# Patient Record
Sex: MALE | Race: BLACK OR AFRICAN AMERICAN | NOT HISPANIC OR LATINO | URBAN - METROPOLITAN AREA
[De-identification: names, ages, dates, MRNs, and addresses within clinical notes are randomized per-mention and may not be internally consistent; named-entity substitution may affect disease eponyms.]

---

## 2023-11-09 ENCOUNTER — HOSPITAL ENCOUNTER (EMERGENCY)
Facility: HOSPITAL | Age: 33
Discharge: HOME | End: 2023-11-09

## 2023-11-09 VITALS
HEART RATE: 65 BPM | OXYGEN SATURATION: 99 % | TEMPERATURE: 98.7 F | DIASTOLIC BLOOD PRESSURE: 68 MMHG | RESPIRATION RATE: 18 BRPM | SYSTOLIC BLOOD PRESSURE: 121 MMHG

## 2023-11-09 DIAGNOSIS — R07.89 CHEST WALL PAIN: Primary | ICD-10-CM

## 2023-11-09 LAB
ALBUMIN SERPL BCP-MCNC: 4.4 G/DL (ref 3.4–5)
ALP SERPL-CCNC: 39 U/L (ref 33–120)
ALT SERPL W P-5'-P-CCNC: 17 U/L (ref 10–52)
ANION GAP SERPL CALC-SCNC: 13 MMOL/L (ref 10–20)
AST SERPL W P-5'-P-CCNC: 21 U/L (ref 9–39)
BASOPHILS # BLD AUTO: 0.03 X10*3/UL (ref 0–0.1)
BASOPHILS NFR BLD AUTO: 0.3 %
BILIRUB SERPL-MCNC: 1.3 MG/DL (ref 0–1.2)
BUN SERPL-MCNC: 13 MG/DL (ref 6–23)
CALCIUM SERPL-MCNC: 9.5 MG/DL (ref 8.6–10.3)
CARDIAC TROPONIN I PNL SERPL HS: 3 NG/L (ref 0–20)
CHLORIDE SERPL-SCNC: 105 MMOL/L (ref 98–107)
CO2 SERPL-SCNC: 25 MMOL/L (ref 21–32)
CREAT SERPL-MCNC: 1.27 MG/DL (ref 0.5–1.3)
EOSINOPHIL # BLD AUTO: 0.06 X10*3/UL (ref 0–0.7)
EOSINOPHIL NFR BLD AUTO: 0.6 %
ERYTHROCYTE [DISTWIDTH] IN BLOOD BY AUTOMATED COUNT: 13 % (ref 11.5–14.5)
GFR SERPL CREATININE-BSD FRML MDRD: 77 ML/MIN/1.73M*2
GLUCOSE SERPL-MCNC: 94 MG/DL (ref 74–99)
HCT VFR BLD AUTO: 47.5 % (ref 41–52)
HGB BLD-MCNC: 17.2 G/DL (ref 13.5–17.5)
IMM GRANULOCYTES # BLD AUTO: 0.03 X10*3/UL (ref 0–0.7)
IMM GRANULOCYTES NFR BLD AUTO: 0.3 % (ref 0–0.9)
LYMPHOCYTES # BLD AUTO: 3.75 X10*3/UL (ref 1.2–4.8)
LYMPHOCYTES NFR BLD AUTO: 38.7 %
MCH RBC QN AUTO: 29.1 PG (ref 26–34)
MCHC RBC AUTO-ENTMCNC: 36.2 G/DL (ref 32–36)
MCV RBC AUTO: 80 FL (ref 80–100)
MONOCYTES # BLD AUTO: 0.51 X10*3/UL (ref 0.1–1)
MONOCYTES NFR BLD AUTO: 5.3 %
NEUTROPHILS # BLD AUTO: 5.32 X10*3/UL (ref 1.2–7.7)
NEUTROPHILS NFR BLD AUTO: 54.8 %
NRBC BLD-RTO: 0 /100 WBCS (ref 0–0)
PLATELET # BLD AUTO: 299 X10*3/UL (ref 150–450)
POTASSIUM SERPL-SCNC: 3.7 MMOL/L (ref 3.5–5.3)
PROT SERPL-MCNC: 7.2 G/DL (ref 6.4–8.2)
RBC # BLD AUTO: 5.91 X10*6/UL (ref 4.5–5.9)
SODIUM SERPL-SCNC: 139 MMOL/L (ref 136–145)
WBC # BLD AUTO: 9.7 X10*3/UL (ref 4.4–11.3)

## 2023-11-09 PROCEDURE — 84484 ASSAY OF TROPONIN QUANT: CPT | Performed by: GENERAL PRACTICE

## 2023-11-09 PROCEDURE — 99284 EMERGENCY DEPT VISIT MOD MDM: CPT | Mod: 25

## 2023-11-09 PROCEDURE — 80053 COMPREHEN METABOLIC PANEL: CPT | Performed by: GENERAL PRACTICE

## 2023-11-09 PROCEDURE — 85025 COMPLETE CBC W/AUTO DIFF WBC: CPT | Performed by: GENERAL PRACTICE

## 2023-11-09 PROCEDURE — 36415 COLL VENOUS BLD VENIPUNCTURE: CPT | Performed by: GENERAL PRACTICE

## 2023-11-09 PROCEDURE — 71045 X-RAY EXAM CHEST 1 VIEW: CPT | Performed by: RADIOLOGY

## 2023-11-09 PROCEDURE — 99285 EMERGENCY DEPT VISIT HI MDM: CPT | Mod: 25

## 2023-11-09 RX ORDER — CYCLOBENZAPRINE HCL 10 MG
5 TABLET ORAL 3 TIMES DAILY PRN
Qty: 9 TABLET | Refills: 0 | Status: SHIPPED | OUTPATIENT
Start: 2023-11-09

## 2023-11-09 RX ORDER — IBUPROFEN 600 MG/1
600 TABLET ORAL EVERY 6 HOURS PRN
Qty: 20 TABLET | Refills: 0 | Status: SHIPPED | OUTPATIENT
Start: 2023-11-09

## 2023-11-09 ASSESSMENT — HEART SCORE
RISK FACTORS: 1-2 RISK FACTORS
HISTORY: SLIGHTLY SUSPICIOUS
TROPONIN: LESS THAN OR EQUAL TO NORMAL LIMIT
AGE: <45
ECG: NORMAL
HEART SCORE: 1

## 2023-11-09 ASSESSMENT — COLUMBIA-SUICIDE SEVERITY RATING SCALE - C-SSRS
6. HAVE YOU EVER DONE ANYTHING, STARTED TO DO ANYTHING, OR PREPARED TO DO ANYTHING TO END YOUR LIFE?: NO
1. IN THE PAST MONTH, HAVE YOU WISHED YOU WERE DEAD OR WISHED YOU COULD GO TO SLEEP AND NOT WAKE UP?: NO
2. HAVE YOU ACTUALLY HAD ANY THOUGHTS OF KILLING YOURSELF?: NO

## 2023-11-09 NOTE — ED TRIAGE NOTES
C/o of nausea medicated with phenergan iv    Pt arrived via private vehicle from home with chief c/o of L sided CP/tightness radiating into L shoulder/back, accompanied with SOB. Pt took an aspirin approx 4 hours ago. Pt states the pain is worse with movement. He denies any known injury. Pt endorses hx of enlarged heart but denies any other medical hx.

## 2023-11-10 NOTE — DISCHARGE INSTRUCTIONS
Try taking Tylenol and/or ibuprofen as needed for pain.  May also try muscle relaxers.  Do not drink alcohol or operate heavy machinery while taking muscle relaxers.  Follow-up with primary care.  Return to nearest ER for any new or worsening symptoms.

## 2023-11-10 NOTE — ED PROVIDER NOTES
Chief Complaint   Patient presents with    Chest Pain     HPI:   Enio Sanabria is an 33 y.o. Male with history of enlarged heart who presents to the ED for evaluation of chest pain began this morning while he was getting ready for work.  Chest pain lasted until he got to work.  Throughout the day it came and went.  Pain not currently present.  Localizes pain to the mid sternum.  Radiates into the  left anterior chest near clavicle and into left upper back/left lateral neck.  Pain is worse with laughing, moving his head and twisting the torso.  Took aspirin which did not relieve pain. patient said something like this happened 4 years ago when he had to go to the hospital.  He said it has been happening off and on since then but today was the first time it lasted as long as it did.  He does admit that he has been working out more than normal.  He feels like this seems to exacerbate these kind of pains.  Father  of an MI otherwise no family history of cardiovascular disease.  Denies personal or family history of bleeding or clotting disorder.  Denies personal family history of connective tissue disorder.  Denies alcohol, tobacco, drug use.  Is not on any daily medications.  Denies any other symptoms.    Medications: NKDA  Soc HX: Denies substance use  No Known Allergies:  History reviewed. No pertinent past medical history.  History reviewed. No pertinent surgical history.  No family history on file.     Physical Exam  Vitals and nursing note reviewed.   Constitutional:       General: He is not in acute distress.     Appearance: Normal appearance. He is not ill-appearing or toxic-appearing.   HENT:      Head: Normocephalic and atraumatic.      Right Ear: External ear normal.      Left Ear: External ear normal.   Eyes:      Pupils: Pupils are equal, round, and reactive to light.   Cardiovascular:      Rate and Rhythm: Normal rate and regular rhythm.      Pulses: Normal pulses.      Heart sounds: No murmur  heard.  Pulmonary:      Effort: Pulmonary effort is normal. No respiratory distress.      Breath sounds: Normal breath sounds.   Abdominal:      General: Bowel sounds are normal.      Palpations: Abdomen is soft.      Tenderness: There is no abdominal tenderness. There is no guarding or rebound.   Musculoskeletal:         General: No deformity or signs of injury. Normal range of motion.      Cervical back: Normal range of motion.   Lymphadenopathy:      Cervical: No cervical adenopathy.   Skin:     General: Skin is warm and dry.      Capillary Refill: Capillary refill takes less than 2 seconds.      Findings: No bruising or rash.   Neurological:      General: No focal deficit present.      Mental Status: He is alert.      Cranial Nerves: No cranial nerve deficit.   Psychiatric:         Mood and Affect: Mood normal.         Behavior: Behavior normal.     VS: As documented in the triage note and EMR flowsheet from this visit were reviewed.    EKG:   EKG INTERPRETATION:      Personally Reviewed      Rhythm:  Normal sinus rhythm      Rate:   63 bpm     Axis: Normal axis      Intervals:  Normal [place] [id number] ms     QRS Complex:  Normal      ST Segment:  Normal ST-T segments      QT Interval:  Normal  QTc 360 ms     Compared with Prior:   similar      Medical Decision Making:   ED Course as of 11/11/23 0227   Thu Nov 09, 2023 2125 Vitals Reviewed: Afebrile. Normotensive. Not tachycardic nor tachypneic. No hypoxia.   [KA]   2229 Patient is a 33-year-old male who presents to the ED for evaluation of chest pain.  His heart score is a 1 for risk factor (family history).  ECG is normal sinus.  Vital signs are normal.  Pain is reproducible with movement.  He has symmetrical pulses in all extremities, no focal deficit, no abdominal or back pain and pain that is not currently present.  I do not suspect AAA.  He PERCs out.  I suspect strained muscle or other musculoskeletal etiology.  No cough, fever nor viral symptoms to  suggest URI or pneumonia.  Labs and x-ray imaging were obtained prior to start of my shift.  Patient waiting in ED for significant amount of time prior to being roomed and seen by myself.  I personally viewed labs.  CMP with slightly elevated total bilirubin otherwise no abnormalities.  CBC without significant abnormalities.  Troponin normal.  X-ray imaging shows no evidence of pneumonia, pleural effusion or cardiomegaly.  Do not feel patient necessitates further labs or imaging.  Advised patient that he should stretch prior to and after working out.  Take NSAIDs and/or Tylenol as needed if pain returns.  Follow-up with primary care provider.  Return to nearest ER for any new or worsening symptoms.  He is agreeable. [KA]      ED Course User Index  [KA] Luisa Burroughs PA-C         Diagnoses as of 11/11/23 0227   Chest wall pain   Escalation of Care: Appropriate for outpatient management     Counseling: Spoke with the patient and discussed today´s findings, in addition to providing specific details for the plan of care and expected course.  Patient was given the opportunity to ask questions.    Discussed return precautions and importance of follow-up.  Advised to follow-up with PCP.  Advised to return to the ED for changing or worsening symptoms, new symptoms, complaint specific precautions, and precautions listed on the discharge paperwork.  Educated on the common potential side effects of medications prescribed.    I advised the patient that the emergency evaluation and treatment provided today doesn't end their need for medical care. It is very important that they follow-up with their primary care provider or other specialist as instructed.    The plan of care was mutually agreed upon with the patient. The patient and/or family were given the opportunity to ask questions. All questions asked today in the ED were answered to the best of my ability with today's information.    I specifically advised the patient to  return to the ED for changing or worsening symptoms, worrisome new symptoms, or for any complaint specific precautions listed on the discharge paperwork.    This patient was cared for in the setting of nationwide stress on resources and staffing.    This report was transcribed using voice recognition software.  Every effort was made to ensure accuracy, however, inadvertently computerized transcription errors may be present.       Luisa Burroughs PA-C  11/11/23 0227

## 2023-11-11 PROBLEM — I51.7 CARDIOMEGALY: Status: ACTIVE | Noted: 2020-11-11

## 2023-11-11 PROBLEM — R63.8 INCREASED BMI: Status: ACTIVE | Noted: 2019-01-13

## 2023-11-11 PROBLEM — R07.89 CHEST WALL PAIN: Status: ACTIVE | Noted: 2020-11-11

## 2023-11-11 PROBLEM — R74.8 ELEVATED CK: Status: ACTIVE | Noted: 2019-01-13
